# Patient Record
Sex: MALE | URBAN - METROPOLITAN AREA
[De-identification: names, ages, dates, MRNs, and addresses within clinical notes are randomized per-mention and may not be internally consistent; named-entity substitution may affect disease eponyms.]

---

## 2018-04-16 ENCOUNTER — OFFICE VISIT (OUTPATIENT)
Dept: FAMILY MEDICINE CLINIC | Age: 63
End: 2018-04-16

## 2018-04-16 VITALS
RESPIRATION RATE: 18 BRPM | HEART RATE: 69 BPM | SYSTOLIC BLOOD PRESSURE: 121 MMHG | BODY MASS INDEX: 32.27 KG/M2 | HEIGHT: 64 IN | TEMPERATURE: 98 F | DIASTOLIC BLOOD PRESSURE: 79 MMHG | WEIGHT: 189 LBS | OXYGEN SATURATION: 98 %

## 2018-04-16 DIAGNOSIS — H11.32 CONJUNCTIVAL HEMORRHAGE OF LEFT EYE: Primary | ICD-10-CM

## 2018-04-16 NOTE — PROGRESS NOTES
Discharge instructions reviewed with patient    Medication list and understanding of medications reviewed with patient. OTC and herbal medications reviewed and added to med list if applicable  Barriers to adherence assessed. Guidance given regarding new medications this visit, including reason for taking this medicine, and common side effects. Appointment for physical made for next month.

## 2018-04-16 NOTE — PROGRESS NOTES
Chief Complaint   Patient presents with    New Patient     Establish healthcare services    Eye Pain      Dust from work  - in left eye causing pain x 3 days     1. When and where did you last receive medical care? 3 years ago- clinic in [address] [facility]- drained right knee    2. When and where did you last have preventive care such as mammogram, pap smears or colon screening? No history of colon screening. 3. What is your current living situation (for example, live alone, live in home with immediate family members)? Lives alone    4. Do you have any problems with communication such trouble seeing, hearing, or understanding instructions? Yes- [place] Only    5. Do you have an advance directive? This is a document that you can give to family members with instructions for how you would want them to make health care decisions for you if you were unable to speak for yourself. (For example, unconscious, delerious)No    PMH/FH/Social Hx reviewed and updated as needed     Applicable screenings reviewed and updated as needed  Medication reconciliation performed. Patient does not know need medication refills. Health Maintenance reviewed.

## 2018-04-17 NOTE — PROGRESS NOTES
HPI  Adam Moyer is a 63 y.o. male being seen today for   Chief Complaint   Patient presents with    New Patient     Establish healthcare services    Eye Pain      Dust from work  - in left eye causing pain x 3 days   . IOV for this pt to care a Rafael .  he states that a few days ago he was using leaf blower. Some dust got in his eyes and he wiped it out and did not think much of it. Later that evening someone told him his eye was red. He tried visine to no effect. No pain, no discharge, no visual changes. History reviewed. No pertinent past medical history. ROS  Patient states that he is feeling well. Denies complaints of chest pain, shortness of breath, swelling of legs, dizziness or weakness. he denies nausea, vomiting or diarrhea. No current outpatient prescriptions on file. No current facility-administered medications for this visit. PE  Visit Vitals    /79 (BP 1 Location: Left arm, BP Patient Position: Sitting)    Pulse 69    Temp 98 °F (36.7 °C) (Oral)    Resp 18    Ht 5' 4\" (1.626 m)    Wt 189 lb (85.7 kg)    SpO2 98%    BMI 32.44 kg/m2        Alert and oriented with normal mood and affect. he is well developed and well nourished . Lungs are clear without wheezing. Heart rate is regular without murmurs or gallops. There is no lower extremity edema. Left eye with dense red coloration to sclera lateral to iris, well demarcated. Limited retinal exam normal bilaterally. No results found for this or any previous visit. Assessment and Plan:        ICD-10-CM ICD-9-CM    1. Conjunctival hemorrhage of left eye H11.32 372.72      Reviewed self limited nature of condition. Monitor for resolution.     rtc or to ED for worsening    rtc for well check and preventive health      Maira Lopes MD